# Patient Record
Sex: MALE | Race: WHITE | NOT HISPANIC OR LATINO | Employment: OTHER | URBAN - METROPOLITAN AREA
[De-identification: names, ages, dates, MRNs, and addresses within clinical notes are randomized per-mention and may not be internally consistent; named-entity substitution may affect disease eponyms.]

---

## 2022-10-19 ENCOUNTER — TELEPHONE (OUTPATIENT)
Dept: PAIN MEDICINE | Facility: CLINIC | Age: 72
End: 2022-10-19
Payer: COMMERCIAL

## 2022-10-19 NOTE — TELEPHONE ENCOUNTER
----- Message from Anna Gibbons sent at 10/19/2022  9:40 AM CDT -----  Contact: Patient  Type:  Needs Medical Advice    Who Called: patient     Would the patient rather a call back or a response via [facility]? Call    Best Call Back Number: [phone] (home) [phone] (work)    Additional Information: Patient would like to know if Federal ((not state) worker's comp is accepted through the office. Please call the patient back to advise

## 2022-10-19 NOTE — TELEPHONE ENCOUNTER
Spoke with Ynes and she states we do take WC federal as long as we have the authorization to treat. Informed Pt.

## 2022-11-21 ENCOUNTER — TELEPHONE (OUTPATIENT)
Dept: SPINE | Facility: CLINIC | Age: 72
End: 2022-11-21
Payer: COMMERCIAL

## 2022-11-21 NOTE — TELEPHONE ENCOUNTER
Spoke with patient's wife. He has been on oxycodone 30 mg 4 times a day for many years. 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 They do not do long term narcotic pain medication. Wife voiced understanding. They are not interested in pain procedures at this time they would like to cancel the appt.